# Patient Record
Sex: MALE | Race: WHITE | NOT HISPANIC OR LATINO | Employment: UNEMPLOYED | URBAN - METROPOLITAN AREA
[De-identification: names, ages, dates, MRNs, and addresses within clinical notes are randomized per-mention and may not be internally consistent; named-entity substitution may affect disease eponyms.]

---

## 2018-11-30 ENCOUNTER — HOSPITAL ENCOUNTER (OUTPATIENT)
Dept: LAB | Facility: HOSPITAL | Age: 63
Discharge: HOME | End: 2018-11-30
Attending: PSYCHIATRY & NEUROLOGY | Admitting: PSYCHIATRY & NEUROLOGY

## 2018-11-30 LAB
ALBUMIN SERPL-MCNC: 3.8 G/DL (ref 3.5–4.8)
ALPHA1 GLOB FLD ELPH-MCNC: 0.2 GM/DL (ref 0.1–0.4)
ALPHA2 GLOB SERPL ELPH-MCNC: 0.7 GM/DL (ref 0.5–1)
B-GLOBULIN SERPL ELPH-MCNC: 1 GM/DL (ref 0.7–1.4)
CONV TOTAL PROTEIN: 7.1 G/DL (ref 6.1–7.9)
ERYTHROCYTE [SEDIMENTATION RATE] IN BLOOD BY WESTERGREN METHOD: 17 MM/HR (ref 0–20)
GAMMA GLOB SERPL ELPH-MCNC: 1.3 GM/DL (ref 0.6–1.6)
INSULIN SERPL-ACNC: NORMAL U[IU]/ML

## 2018-12-03 LAB
ANA SER QL IA: NORMAL

## 2018-12-21 ENCOUNTER — HOSPITAL ENCOUNTER (OUTPATIENT)
Dept: ORTHOPEDIC SURGERY | Facility: CLINIC | Age: 63
Discharge: HOME | End: 2018-12-21
Attending: PHYSICIAN ASSISTANT | Admitting: PHYSICIAN ASSISTANT

## 2018-12-27 ENCOUNTER — HOSPITAL ENCOUNTER (OUTPATIENT)
Dept: MRI IMAGING | Facility: HOSPITAL | Age: 63
Discharge: HOME | End: 2018-12-27
Attending: PHYSICIAN ASSISTANT | Admitting: PHYSICIAN ASSISTANT

## 2018-12-27 LAB — CREAT BLDA-MCNC: 0.7 MG/DL (ref 0.6–1.3)

## 2019-02-07 ENCOUNTER — HOSPITAL ENCOUNTER (OUTPATIENT)
Dept: ORTHOPEDIC SURGERY | Facility: CLINIC | Age: 64
Discharge: HOME | End: 2019-02-07
Attending: NEUROLOGICAL SURGERY | Admitting: NEUROLOGICAL SURGERY

## 2019-04-08 ENCOUNTER — HOSPITAL ENCOUNTER (OUTPATIENT)
Dept: ORTHOPEDIC SURGERY | Facility: CLINIC | Age: 64
Discharge: HOME | End: 2019-04-08
Attending: NEUROLOGICAL SURGERY | Admitting: NEUROLOGICAL SURGERY

## 2019-05-23 ENCOUNTER — CONVERSION ENCOUNTER (OUTPATIENT)
Dept: CARDIOLOGY | Facility: CLINIC | Age: 64
End: 2019-05-23

## 2019-06-04 VITALS
SYSTOLIC BLOOD PRESSURE: 143 MMHG | HEART RATE: 61 BPM | DIASTOLIC BLOOD PRESSURE: 84 MMHG | OXYGEN SATURATION: 96 % | WEIGHT: 195 LBS

## 2019-06-06 ENCOUNTER — HOSPITAL ENCOUNTER (OUTPATIENT)
Dept: ORTHOPEDIC SURGERY | Facility: CLINIC | Age: 64
Discharge: HOME | End: 2019-06-06
Attending: NEUROLOGICAL SURGERY | Admitting: NEUROLOGICAL SURGERY

## 2019-06-11 ENCOUNTER — TRANSCRIBE ORDERS (OUTPATIENT)
Dept: PHYSICAL THERAPY | Facility: CLINIC | Age: 64
End: 2019-06-11

## 2019-06-11 DIAGNOSIS — M47.12 MYELOPATHY, SPONDYLOGENIC, CERVICAL: ICD-10-CM

## 2019-06-11 DIAGNOSIS — M50.20 HERNIATED CERVICAL INTERVERTEBRAL DISC: ICD-10-CM

## 2019-06-11 DIAGNOSIS — M43.22 CERVICAL VERTEBRAL FUSION: Primary | ICD-10-CM

## 2019-06-17 ENCOUNTER — OFFICE VISIT (OUTPATIENT)
Dept: PHYSICAL THERAPY | Facility: CLINIC | Age: 64
End: 2019-06-17

## 2019-06-17 DIAGNOSIS — M54.2 PAIN OF CERVICAL SPINE: Primary | ICD-10-CM

## 2019-06-17 DIAGNOSIS — R42 DIZZINESS AND GIDDINESS: ICD-10-CM

## 2019-06-17 DIAGNOSIS — M54.2 NECK PAIN, CHRONIC: ICD-10-CM

## 2019-06-17 DIAGNOSIS — M43.22 FUSION OF SPINE OF CERVICAL REGION: Primary | ICD-10-CM

## 2019-06-17 DIAGNOSIS — M47.12 OTHER SPONDYLOSIS WITH MYELOPATHY, CERVICAL REGION: ICD-10-CM

## 2019-06-17 DIAGNOSIS — M50.20 OTHER CERVICAL DISC DISPLACEMENT, UNSPECIFIED CERVICAL REGION: ICD-10-CM

## 2019-06-17 DIAGNOSIS — G89.29 NECK PAIN, CHRONIC: ICD-10-CM

## 2019-06-17 PROCEDURE — 97140 MANUAL THERAPY 1/> REGIONS: CPT | Performed by: PHYSICAL THERAPIST

## 2019-06-17 PROCEDURE — 97110 THERAPEUTIC EXERCISES: CPT | Performed by: PHYSICAL THERAPIST

## 2019-06-17 NOTE — PROGRESS NOTES
Physical Therapy Daily Progress Note         Visit # : 7  Hiren Mart    Subjective Reports 1-2/10 pain currently. Pt states pain varies day to day. Dizziness is about the same. Pt will be on vacation over the next couple of weeks, then will be having further testing when he returns to town.        Objective   See Exercise, Manual, and Modality Logs for complete treatment.       Assessment/Plan Pt tolerated manual therapy fairly well, but reported some increased stiffness with turning head left when switching UEs for nerve glides. Therex was well tolerated, but pt felt some increased stiffness at end of session. No c/o any significant increase in pain by end of session.     Progress per Plan of Care when pt returns from vacation and further testing.           Manual Therapy:    26     mins  39399;  Therapeutic Exercise:    15     mins  51155;     Neuromuscular [place]:        mins  94843;    Therapeutic Activity:          mins  84833;     Gait Training:           mins  63883;     Ultrasound:          mins  72696;    Electrical Stimulation:         mins  66289 ( );  Dry Needling          mins self-pay    Timed Treatment:   41   mins   Total Treatment:     41   mins    Mana Franco, PT MS  Physical Therapist

## 2019-07-01 ENCOUNTER — APPOINTMENT (OUTPATIENT)
Dept: CT IMAGING | Facility: HOSPITAL | Age: 64
End: 2019-07-01

## 2019-08-09 RX ORDER — LORATADINE 10 MG/1
TABLET ORAL EVERY 24 HOURS
COMMUNITY
Start: 2018-11-15

## 2019-08-09 RX ORDER — APIXABAN 5 MG/1
TABLET, FILM COATED ORAL
Refills: 0 | COMMUNITY
Start: 2019-06-03

## 2019-08-09 RX ORDER — HYDROCODONE BITARTRATE AND ACETAMINOPHEN 10; 325 MG/1; MG/1
TABLET ORAL
COMMUNITY
Start: 2018-11-15

## 2019-08-09 RX ORDER — OSELTAMIVIR PHOSPHATE 75 MG/1
CAPSULE ORAL
COMMUNITY
End: 2019-12-16

## 2019-08-09 RX ORDER — PREDNISONE 20 MG/1
TABLET ORAL
COMMUNITY
End: 2019-12-16

## 2019-08-09 RX ORDER — ATORVASTATIN CALCIUM 10 MG/1
10 TABLET, FILM COATED ORAL DAILY
Refills: 3 | COMMUNITY
Start: 2019-05-17

## 2019-08-09 RX ORDER — BENZONATATE 200 MG/1
CAPSULE ORAL
COMMUNITY
End: 2019-12-16

## 2019-08-09 RX ORDER — AZITHROMYCIN 250 MG/1
TABLET, FILM COATED ORAL
COMMUNITY
End: 2019-12-16

## 2019-08-09 RX ORDER — FLUTICASONE PROPIONATE 50 MCG
SPRAY, SUSPENSION (ML) NASAL
COMMUNITY
Start: 2018-10-02

## 2019-08-09 RX ORDER — MECLIZINE HYDROCHLORIDE 25 MG/1
TABLET ORAL
COMMUNITY
End: 2019-12-16

## 2019-08-09 RX ORDER — MELOXICAM 15 MG/1
TABLET ORAL
COMMUNITY
Start: 2019-05-09

## 2019-08-09 RX ORDER — ZOLPIDEM TARTRATE 10 MG/1
TABLET ORAL
Refills: 0 | COMMUNITY
Start: 2019-06-14

## 2019-08-22 ENCOUNTER — OFFICE VISIT (OUTPATIENT)
Dept: CARDIOLOGY | Facility: CLINIC | Age: 64
End: 2019-08-22

## 2019-08-22 VITALS
BODY MASS INDEX: 27.62 KG/M2 | SYSTOLIC BLOOD PRESSURE: 134 MMHG | WEIGHT: 198 LBS | DIASTOLIC BLOOD PRESSURE: 83 MMHG | OXYGEN SATURATION: 98 % | HEART RATE: 60 BPM

## 2019-08-22 DIAGNOSIS — I48.0 AF (PAROXYSMAL ATRIAL FIBRILLATION) (HCC): Primary | ICD-10-CM

## 2019-08-22 PROCEDURE — 99214 OFFICE O/P EST MOD 30 MIN: CPT | Performed by: INTERNAL MEDICINE

## 2019-08-22 PROCEDURE — 93000 ELECTROCARDIOGRAM COMPLETE: CPT | Performed by: INTERNAL MEDICINE

## 2019-08-22 NOTE — PROGRESS NOTES
Encounter Date:08/22/2019  Last seen 5/23/2019      Patient ID: Hiren Mart is a 63 y.o. male.    Chief Complaint:  Follow-up paroxysmal atrial fibrillation  Dyslipidemia  Anticoagulation management.    History of Present Illness  Since I have last seen, the patient has been without any chest discomfort ,shortness of breath, palpitations, dizziness or syncope.  Denies having any headache ,abdominal pain ,nausea, vomiting , diarrhea constipation, loss of weight or loss of appetite.  Denies having any excessive bruising ,hematuria or blood in the stool.    Review of all systems negative except as indicated      Assessment and Plan       ]]]]]]]]]]]]]]   impression  =========  - history of intermittent dizziness and paroxysmal atrial fibrillation.  Improved.  There is some correlation between dizziness and heme being in atrial fibrillation.    Echocardiogram revealed normal left ventricular function mild right ventricle enlargement 01/28/2019  Thyroid function was normal    -Status post   cervical spine surgery on 01/22/2019 and underwent a C5 corpectomy at C6-7 diskectomy was C4 through 7 anterior cervical arthrodesis secondary to spondylitic myeloradiculopathy Form by Dr. Katz.     -Dysphagia after surgery. -improved     -Paroxysmal atrial fibrillation which may have been responsible for dizzy spells as an outpatient.     -Dyslipidemia  ===========  Plan  ==========  Patient is not having any angina pectoris or congestive heart failure.  Palpitations have improved.  EKG showed sinus bradycardia without ischemic changes    Continue metoprolol XL 25 mg 3 times a day  and Eliquis 5 mg twice a day  Anticoagulation status was reviewed  Follow-up in the office in 6 months.  Further plan will depend on patient's progress.   ]]]]]]]]]]]]]]]]         Diagnosis Plan   1. AF (paroxysmal atrial fibrillation) (CMS/[facility])  ECG 12 Lead   LAB RESULTS (LAST 7 DAYS)    CBC        BMP        CMP         BNP        TROPONIN         CoAg        Creatinine Clearance  Estimated Creatinine Clearance: 106.7 mL/min (by C-G formula based on SCr of 0.9 mg/dL).    ABG        Radiology  No radiology results for the last day                The following portions of the patient's history were reviewed and updated as appropriate: allergies, current medications, past family history, past medical history, past social history, past surgical history and problem list.    Review of Systems   Constitution: Negative for malaise/fatigue.   Cardiovascular: Negative for chest pain, leg swelling, palpitations and syncope.   Respiratory: Negative for shortness of breath.    Skin: Negative for rash.   Gastrointestinal: Positive for nausea. Negative for vomiting.   Neurological: Positive for dizziness and numbness (hands). Negative for light-headedness.         Current Outpatient Medications:   •  fluticasone (FLONASE) 50 MCG/ACT nasal spray, fluticasone propionate 50 mcg/actuation nasal spray,suspension, Disp: , Rfl:   •  HYDROcodone-acetaminophen (NORCO)  MG per tablet, hydrocodone 10 mg-acetaminophen 325 mg tablet, Disp: , Rfl:   •  loratadine (CLARITIN) 10 MG tablet, Daily., Disp: , Rfl:   •  meloxicam (MOBIC) 15 MG tablet, meloxicam 15 mg tablet, Disp: , Rfl:   •  atorvastatin (LIPITOR) 10 MG tablet, Take 10 mg by mouth Daily. 200001, Disp: , Rfl: 3  •  azithromycin (ZITHROMAX) 250 MG tablet, azithromycin 250 mg tablet, Disp: , Rfl:   •  benzonatate (TESSALON) 200 MG capsule, benzonatate 200 mg capsule, Disp: , Rfl:   •  ELIQUIS 5 MG tablet tablet, , Disp: , Rfl: 0  •  meclizine (ANTIVERT) 25 MG tablet, meclizine 25 mg tablet, Disp: , Rfl:   •  oseltamivir (TAMIFLU) 75 MG capsule, oseltamivir 75 mg capsule, Disp: , Rfl:   •  predniSONE (DELTASONE) 20 MG tablet, prednisone 20 mg tablet, Disp: , Rfl:   •  zolpidem (AMBIEN) 10 MG tablet, , Disp: , Rfl: 0    Allergies   Allergen Reactions   • Penicillins Other (See Comments)     AS CHILD UNKNOWN REACTION          Family History   Problem Relation Age of Onset   • Heart disease Mother    • Stroke Father    • Diabetes Brother        Past Surgical History:   Procedure Laterality Date   • APPENDECTOMY     • BACK SURGERY     • HERNIA REPAIR         Past Medical History:   Diagnosis Date   • Hyperlipidemia        Family History   Problem Relation Age of Onset   • Heart disease Mother    • Stroke Father    • Diabetes Brother        Social History     Socioeconomic History   • Marital status:      Spouse name: Not on file   • Number of children: Not on file   • Years of education: Not on file   • Highest education level: Not on file   Tobacco Use   • Smoking status: Current Every Day Smoker     Types: Electronic Cigarette   • Smokeless tobacco: Never Used   Substance and Sexual Activity   • Alcohol use: Yes     Comment: rare           ECG 12 Lead  Date/Time: 8/22/2019 4:40 PM  Performed by: John Mon MD  Authorized by: John Mon MD   Comparison: compared with previous ECG   Comments: Sinus bradycardia 54/min normal axis normal intervals no ectopy.  No change from 5/23/2019              Objective:       Physical Exam    /83 (BP Location: Left arm, Patient Position: Lying, Cuff Size: Adult)   Pulse 60   Wt 89.8 kg (198 lb)   SpO2 98%   BMI 27.62 kg/m²   The patient is alert, oriented and in no distress.    Vital signs as noted above.    Head and neck revealed no carotid bruits or jugular venous distension.  No thyromegaly or lymphadenopathy is present.    Lungs clear.  No wheezing.  Breath sounds are normal bilaterally.    Heart normal first and second heart sounds.  No murmur..  No pericardial rub is present.  No gallop is present.    Abdomen soft and nontender.  No organomegaly is present.    Extremities revealed good peripheral pulses without any pedal edema.    Skin warm and dry.    Musculoskeletal system is grossly normal.    CNS grossly normal.

## 2019-09-16 ENCOUNTER — OFFICE VISIT (OUTPATIENT)
Dept: NEUROSURGERY | Facility: CLINIC | Age: 64
End: 2019-09-16

## 2019-09-16 VITALS
HEIGHT: 71 IN | BODY MASS INDEX: 28 KG/M2 | OXYGEN SATURATION: 96 % | DIASTOLIC BLOOD PRESSURE: 79 MMHG | RESPIRATION RATE: 18 BRPM | HEART RATE: 62 BPM | WEIGHT: 200 LBS | SYSTOLIC BLOOD PRESSURE: 145 MMHG

## 2019-09-16 DIAGNOSIS — M47.12 CERVICAL SPONDYLOSIS WITH MYELOPATHY: ICD-10-CM

## 2019-09-16 DIAGNOSIS — Z98.1 S/P CERVICAL SPINAL FUSION: ICD-10-CM

## 2019-09-16 DIAGNOSIS — M54.2 NECK PAIN: Primary | ICD-10-CM

## 2019-09-16 PROCEDURE — 99212 OFFICE O/P EST SF 10 MIN: CPT | Performed by: NEUROLOGICAL SURGERY

## 2019-09-16 NOTE — PROGRESS NOTES
"Subjective   Hiren Mart is a 63 y.o. male.     Chief Complaint   Patient presents with   • Follow-up     f/u after imaging      Visit Vitals  /79 (BP Location: Left arm, Patient Position: Sitting, Cuff Size: Large Adult)   Pulse 62   Resp 18   Ht 180.3 cm (71\")   Wt 90.7 kg (200 lb)   SpO2 96%   BMI 27.89 kg/m²       History of Present Illness: Mr Mart is a 64 y/o male that is now 5 months post op from a C5 vertebrectomy with a C4-7 ACDF.  Since the last visit his pain has pretty much resolved.  He still has the dizzy episodes but no real neck pain.  His strength and gait have also improved.  He is to see neurology in the next couple of weeks.     The following portions of the patient's history were reviewed and updated as appropriate: allergies, current medications, past family history, past medical history, past social history, past surgical history and problem list.    Review of Systems      Past Surgical History:   Procedure Laterality Date   • APPENDECTOMY     • BACK SURGERY     • HERNIA REPAIR         Past Medical History:   Diagnosis Date   • Atrial fibrillation (CMS/HCC)    • Hyperlipidemia    • Hypertension    • Neck pain        Objective   Physical Exam  Neurologic Exam  Ortho Exam    No cervical tenderness  Motor is 5/5  Gait much improved    DATE OF EXAM:  9/16/2019 12:13 PM     PROCEDURE:  XR SPINE CERVICAL 2 VW-     INDICATIONS:  neck pain; M54.2-Cervicalgia     COMPARISON:  06/06/2019     TECHNIQUE:   Two or three radiologic views of the cervical spine were obtained.     FINDINGS:  Redemonstration of postoperative changes of ACDF at C4-C7. There is mild  disc narrowing again noted at the C3-4 level with anterior marginal  osteophyte is unchanged. The prevertebral soft tissues are normal in  thickness. The hardware configuration is unchanged. No acute  complication. Lung apices are clear.      IMPRESSION:  Intact ACDF hardware at C4-C7, unchanged from 06/06/2019.         Assessment and " Plan: Mr Mart is doing much better.  His cervical films demonstrate stable hardware placement with no evidence of failure.  I can not see that there is a solid arthrodesis at the corpectomy.  At this time we will see him back in 3 months with a CT of the cervical spine to better address the arthrodesis.       Problems Addressed this Visit     None

## 2019-09-17 PROBLEM — M47.12 CERVICAL SPONDYLOSIS WITH MYELOPATHY: Status: ACTIVE | Noted: 2019-09-17

## 2019-09-17 PROBLEM — Z98.1 S/P CERVICAL SPINAL FUSION: Status: ACTIVE | Noted: 2019-09-17

## 2019-09-17 PROBLEM — M54.2 NECK PAIN: Status: ACTIVE | Noted: 2019-09-17

## 2019-09-19 ENCOUNTER — OFFICE VISIT (OUTPATIENT)
Dept: NEUROLOGY | Facility: CLINIC | Age: 64
End: 2019-09-19

## 2019-09-19 VITALS
DIASTOLIC BLOOD PRESSURE: 81 MMHG | HEIGHT: 72 IN | SYSTOLIC BLOOD PRESSURE: 156 MMHG | WEIGHT: 199.6 LBS | HEART RATE: 67 BPM | BODY MASS INDEX: 27.04 KG/M2

## 2019-09-19 DIAGNOSIS — M47.12 CERVICAL SPONDYLOSIS WITH MYELOPATHY: Primary | ICD-10-CM

## 2019-09-19 DIAGNOSIS — M54.2 NECK PAIN: ICD-10-CM

## 2019-09-19 DIAGNOSIS — Z98.1 S/P CERVICAL SPINAL FUSION: ICD-10-CM

## 2019-09-19 PROCEDURE — 99214 OFFICE O/P EST MOD 30 MIN: CPT | Performed by: PSYCHIATRY & NEUROLOGY

## 2019-09-19 RX ORDER — GABAPENTIN 100 MG/1
CAPSULE ORAL
Qty: 180 CAPSULE | Refills: 11 | Status: SHIPPED | OUTPATIENT
Start: 2019-09-19 | End: 2020-09-22 | Stop reason: SDUPTHER

## 2019-09-19 NOTE — PROGRESS NOTES
Subjective:     Patient ID: Hiren aMrt is a 63 y.o. male.    Chief complaint:  Dizziness &  Paresthesia of lower extremity     Dizziness same as documented at the initial consultation November 2018.  The episodes of dizziness occur several times a day.  When the episodes occur, they may  last for all day.  The episodes are brought on or made worse with turning onto right side, turning onto left side, change in position, sitting to standing and tilting head    Patient wakes up with headaches c/o fatigue takes daytime naps has been told he snores was dx with sleep apnea currently doesn't use a pap machine due to the beard and feels like it suffocates him. Patient had sleep study done about 2 years ago at a sleep clinic in [place] and don't think there still there.    . Patient has a hard time sleeping at night wakes up frequently takes sleep medication and if doesn't take the medication has nightmares, averages 5-6 hours of sleep per night. Still c/o dizziness, was told he had a-fib currently seeing Dr. Mon.      Paresthesia of lower extremity,  EMG was essentially normal   Post op from a C5 vertebrectomy with a C4-7 ACDF pain has resolved still with little pain when sleeps on his neck.  Neck surgery did not help the light headed dizziness feeling.       The following portions of the patient's history were reviewed and updated as appropriate: allergies, current medications, past family history, past medical history, past social history, past surgical history and problem list.      Current Outpatient Medications:   •  atorvastatin (LIPITOR) 10 MG tablet, Take 10 mg by mouth Daily. 200001, Disp: , Rfl: 3  •  azithromycin (ZITHROMAX) 250 MG tablet, azithromycin 250 mg tablet, Disp: , Rfl:   •  benzonatate (TESSALON) 200 MG capsule, benzonatate 200 mg capsule, Disp: , Rfl:   •  ELIQUIS 5 MG tablet tablet, , Disp: , Rfl: 0  •  fluticasone (FLONASE) 50 MCG/ACT nasal spray, fluticasone propionate 50  mcg/actuation nasal spray,suspension, Disp: , Rfl:   •  HYDROcodone-acetaminophen (NORCO)  MG per tablet, hydrocodone 10 mg-acetaminophen 325 mg tablet, Disp: , Rfl:   •  loratadine (CLARITIN) 10 MG tablet, Daily., Disp: , Rfl:   •  meclizine (ANTIVERT) 25 MG tablet, meclizine 25 mg tablet, Disp: , Rfl:   •  meloxicam (MOBIC) 15 MG tablet, meloxicam 15 mg tablet, Disp: , Rfl:   •  metoprolol tartrate (LOPRESSOR) 25 MG tablet, Take 25 mg by mouth 2 (Two) Times a Day., Disp: , Rfl:   •  oseltamivir (TAMIFLU) 75 MG capsule, oseltamivir 75 mg capsule, Disp: , Rfl:   •  predniSONE (DELTASONE) 20 MG tablet, prednisone 20 mg tablet, Disp: , Rfl:   •  zolpidem (AMBIEN) 10 MG tablet, , Disp: , Rfl: 0    Review of Systems   Constitutional: Positive for fatigue. Negative for appetite change.   HENT: Negative for sinus pressure and sinus pain.    Eyes: Negative for pain and itching.   Respiratory: Negative for cough and shortness of breath.    Cardiovascular: Positive for palpitations. Negative for chest pain.   Gastrointestinal: Negative for constipation and diarrhea.   Endocrine: Negative for cold intolerance and heat intolerance.   Genitourinary: Negative for difficulty urinating and frequency.   Musculoskeletal: Positive for back pain and neck pain.   Allergic/Immunologic: Negative for environmental allergies.   Neurological: Positive for dizziness, light-headedness, numbness and headaches. Negative for tremors, seizures, syncope, facial asymmetry, speech difficulty and weakness.   Psychiatric/Behavioral: Negative for agitation and confusion.          I have reviewed ROS completed by medical assistant.     Objective:    Physical Exam   Constitutional: He is oriented to person, place, and time. He appears well-developed and well-nourished.   HENT:   Head: Normocephalic.   Eyes: Conjunctivae and EOM are normal. Pupils are equal, round, and reactive to light.   Neurological: He is alert and oriented to person, place, and  time. No cranial nerve deficit. Coordination normal.   Psychiatric: He has a normal mood and affect.   Nursing note and vitals reviewed.      Assessment/Plan:    Hiren was seen today for dizziness and paresthesia of lower extremity,.    Diagnoses and all orders for this visit:    Cervical spondylosis with myelopathy    S/P cervical spinal fusion    Neck pain      1) non specific  Dizziness.  He has been evaluated extensively with out specific etiology.    No additional testing is felt indicated at this time.    2) Numbness in the hands, probable residual. From the cervical spine problems.     3) burning in legs with numbness- stable - will Rx gabapentin        EPWORTH SLEEPINESS SCALE  Sitting and reading  0  WatchingTV  3  Sitting, inactive, in a public place  0  As a passenger in a car for 1 hour w/o a break  0  Lying down to rest in the afternoon  3  Sitting and talking to someone  0  Sitting quietly after a lunch  0  In a car, while stopped for traffic or a light  0  Total 6          This document has been electronically signed by Joseph Seipel, MD on September 19, 2019 5:41 PM

## 2019-12-13 ENCOUNTER — HOSPITAL ENCOUNTER (OUTPATIENT)
Dept: CT IMAGING | Facility: HOSPITAL | Age: 64
Discharge: HOME | End: 2019-12-13
Admitting: NEUROLOGICAL SURGERY

## 2019-12-13 DIAGNOSIS — M47.12 CERVICAL SPONDYLOSIS WITH MYELOPATHY: ICD-10-CM

## 2019-12-13 DIAGNOSIS — Z98.1 S/P CERVICAL SPINAL FUSION: ICD-10-CM

## 2019-12-13 PROCEDURE — 72125 CT NECK SPINE W/O DYE: CPT

## 2019-12-16 ENCOUNTER — OFFICE VISIT (OUTPATIENT)
Dept: NEUROSURGERY | Facility: CLINIC | Age: 64
End: 2019-12-16

## 2019-12-16 ENCOUNTER — DOCUMENTATION (OUTPATIENT)
Dept: PHYSICAL THERAPY | Facility: CLINIC | Age: 64
End: 2019-12-16

## 2019-12-16 VITALS
SYSTOLIC BLOOD PRESSURE: 137 MMHG | HEART RATE: 49 BPM | DIASTOLIC BLOOD PRESSURE: 79 MMHG | WEIGHT: 205 LBS | HEIGHT: 73 IN | BODY MASS INDEX: 27.17 KG/M2

## 2019-12-16 DIAGNOSIS — Z98.1 S/P CERVICAL SPINAL FUSION: Primary | ICD-10-CM

## 2019-12-16 PROBLEM — E78.5 HYPERLIPIDEMIA: Status: ACTIVE | Noted: 2018-11-15

## 2019-12-16 PROBLEM — G89.29 CHRONIC MIDLINE LOW BACK PAIN WITH BILATERAL SCIATICA: Status: ACTIVE | Noted: 2017-05-15

## 2019-12-16 PROBLEM — M19.90 OSTEOARTHRITIS: Status: ACTIVE | Noted: 2018-11-15

## 2019-12-16 PROBLEM — M54.50 LOW BACK PAIN: Status: ACTIVE | Noted: 2018-12-21

## 2019-12-16 PROBLEM — R42 DIZZINESS: Status: ACTIVE | Noted: 2018-05-02

## 2019-12-16 PROBLEM — M47.812 CERVICAL SPONDYLOSIS: Status: ACTIVE | Noted: 2019-04-29

## 2019-12-16 PROBLEM — I48.0 PAROXYSMAL ATRIAL FIBRILLATION (HCC): Status: ACTIVE | Noted: 2019-02-06

## 2019-12-16 PROBLEM — M54.41 CHRONIC MIDLINE LOW BACK PAIN WITH BILATERAL SCIATICA: Status: ACTIVE | Noted: 2017-05-15

## 2019-12-16 PROBLEM — M54.2 NECK PAIN: Status: ACTIVE | Noted: 2018-11-15

## 2019-12-16 PROBLEM — G47.00 INSOMNIA: Status: ACTIVE | Noted: 2017-05-15

## 2019-12-16 PROBLEM — G95.0 SYRINX OF SPINAL CORD (HCC): Status: ACTIVE | Noted: 2018-12-21

## 2019-12-16 PROBLEM — M43.22 CERVICAL SPINE ANKYLOSIS: Status: ACTIVE | Noted: 2019-02-07

## 2019-12-16 PROBLEM — G95.89 MYELOMALACIA (HCC): Status: ACTIVE | Noted: 2018-12-21

## 2019-12-16 PROBLEM — M50.20 CERVICAL DISC HERNIATION: Status: ACTIVE | Noted: 2018-12-21

## 2019-12-16 PROBLEM — M47.12 CERVICAL SPONDYLOSIS WITH MYELOPATHY: Status: ACTIVE | Noted: 2018-12-04

## 2019-12-16 PROBLEM — IMO0002 EXCESSIVE ANTICOAGULATION: Status: ACTIVE | Noted: 2019-02-14

## 2019-12-16 PROBLEM — R20.2 PARESTHESIA OF LOWER EXTREMITY: Status: ACTIVE | Noted: 2018-11-15

## 2019-12-16 PROBLEM — R20.2 PARESTHESIA OF UPPER LIMB: Status: ACTIVE | Noted: 2019-05-09

## 2019-12-16 PROBLEM — M54.42 CHRONIC MIDLINE LOW BACK PAIN WITH BILATERAL SCIATICA: Status: ACTIVE | Noted: 2017-05-15

## 2019-12-16 PROBLEM — Z98.890 HX OF CERVICAL SPINE SURGERY: Status: ACTIVE | Noted: 2019-05-13

## 2019-12-16 PROBLEM — M17.0 DEGENERATIVE ARTHRITIS OF KNEE, BILATERAL: Status: ACTIVE | Noted: 2017-05-11

## 2019-12-16 PROBLEM — M47.029 VERTEBRAL ARTERY COMPRESSION SYNDROME: Status: ACTIVE | Noted: 2018-12-21

## 2019-12-16 PROCEDURE — 99213 OFFICE O/P EST LOW 20 MIN: CPT | Performed by: PHYSICIAN ASSISTANT

## 2019-12-16 NOTE — PROGRESS NOTES
"Subjective   Hiren Mart is a 64 y.o. male.     Chief Complaint   Patient presents with   • Neck Pain     Follow up CT /      Visit Vitals  /79   Pulse (!) 49   Ht 185.4 cm (73\")   Wt 93 kg (205 lb)   BMI 27.05 kg/m²       History of Present Illness:  Mr. Hiren Mart is a 64-year-old male presents the office today as a postop follow-up.  On 1/22/2019 patient underwent a 5 vertebrectomy with a C4-7 anterior cervical discectomy and fusion.  Patient has no new concerns or complaints today.    The following portions of the patient's history were reviewed and updated as appropriate: allergies, current medications, past family history, past medical history, past social history, past surgical history and problem list.    Review of Systems   Constitutional: Negative for activity change, appetite change, chills, diaphoresis, fatigue and fever.   Gastrointestinal: Negative for nausea and vomiting.   Genitourinary: Negative for urinary incontinence.   Musculoskeletal: Negative for back pain and neck pain.   Neurological: Negative for weakness and numbness.   Psychiatric/Behavioral: Negative for sleep disturbance.         Past Surgical History:   Procedure Laterality Date   • APPENDECTOMY     • BACK SURGERY     • HERNIA REPAIR     • NECK SURGERY         Past Medical History:   Diagnosis Date   • Atrial fibrillation (CMS/HCC)    • Cancer (CMS/HCC)    • Hyperlipidemia    • Hypertension    • Neck pain        Objective   Physical Exam   Constitutional: He is oriented to person, place, and time. He appears well-developed and well-nourished.   HENT:   Head: Normocephalic.   Eyes: Pupils are equal, round, and reactive to light.   Neurological: He is alert and oriented to person, place, and time.   No motor deficits or sensory deficits   Skin:   Well-healed surgical incision   Vitals reviewed.    Neurologic Exam     Mental Status   Oriented to person, place, and time.     Cranial Nerves     CN III, IV, VI   Pupils are " equal, round, and reactive to light.    Ortho Exam        Assessment and Plan:   Mr. Mart is now approximately 11 months postop from a C5 vertebrectomy of C4-7 ACDF.  Dr. Katz did review his CT scan of his cervical spine and although he does demonstrate some bone ingrowth he does not have a solid fusion.  Patient denies any pain or return of his preoperative symptoms.  He has no difficulty swallowing.  He has no concerns today.      At this time, since he is asymptomatic, we will plan to follow Mr. Mart with a repeat CT scan in 2 months to reassess the area of fusion, he will be a little over 12 months postop at that point.      Problems Addressed this Visit     None

## 2019-12-16 NOTE — PROGRESS NOTES
Discharge Summary  Discharge Summary from Physical Therapy Report      Dates  PT visit: Last seen 6/17/19  Number of Visits: 7    Discharge Status of Patient: Pt was seen for 7 PT visits with 6 of them in the [facility] system. Pt went on vacation and was going to have some further testing when he returned. Pt never returned any calls to determine if he would be returning so pt was discharged.     Goals: undetermined as pt never returned to PT after the 7th visit    Discharge Plan: no specific D/C instructions were given as pt never returned to PT after going on vacation.    Date of Discharge: 12/16/19      Mana Franco, PT  Physical Therapist  [place] License # [id number]

## 2020-02-19 ENCOUNTER — TELEPHONE (OUTPATIENT)
Dept: NEUROSURGERY | Facility: CLINIC | Age: 65
End: 2020-02-19

## 2020-02-19 DIAGNOSIS — Z98.1 S/P CERVICAL SPINAL FUSION: Primary | ICD-10-CM

## 2020-02-19 DIAGNOSIS — M47.12 CERVICAL SPONDYLOSIS WITH MYELOPATHY: ICD-10-CM

## 2020-09-22 ENCOUNTER — OFFICE VISIT (OUTPATIENT)
Dept: NEUROLOGY | Facility: CLINIC | Age: 65
End: 2020-09-22

## 2020-09-22 VITALS
TEMPERATURE: 98 F | SYSTOLIC BLOOD PRESSURE: 134 MMHG | DIASTOLIC BLOOD PRESSURE: 81 MMHG | HEIGHT: 73 IN | WEIGHT: 195.4 LBS | HEART RATE: 56 BPM | BODY MASS INDEX: 25.9 KG/M2

## 2020-09-22 DIAGNOSIS — M47.12 CERVICAL SPONDYLOSIS WITH MYELOPATHY: Primary | ICD-10-CM

## 2020-09-22 PROCEDURE — 99212 OFFICE O/P EST SF 10 MIN: CPT | Performed by: PSYCHIATRY & NEUROLOGY

## 2020-09-22 RX ORDER — GABAPENTIN 100 MG/1
CAPSULE ORAL
Qty: 180 CAPSULE | Refills: 5 | Status: SHIPPED | OUTPATIENT
Start: 2020-09-22

## 2020-09-22 NOTE — PROGRESS NOTES
Subjective: Cervical spondylosis with myelopathy, S/P cervical spinal fusion, Neck pain    Patient ID: Hiren Mart is a 64 y.o. male.    History of Present Illness, yearly f/u     Cervical spondylosis with myelopathy, burning in legs with numbness controlled with gabapentin. Unchanged      S/P cervical spinal fusion, numbness in the hands. Unchanged      Neck pain, dizziness with moving directions.    Patient still with dizziness, neck pain is some better and sometimes has numbness and pain down the shoulders bilateral.   Pt has vertigo  Brought on by change in position or turning head   Also has light headedness at times    The following portions of the patient's history were reviewed and updated as appropriate: allergies, current medications, past family history, past medical history, past social history, past surgical history and problem list.    Family History   Problem Relation Age of Onset   • Heart disease Mother    • Stroke Father    • Diabetes Brother        Past Medical History:   Diagnosis Date   • Atrial fibrillation (CMS/HCC)    • Cancer (CMS/HCC)    • Hyperlipidemia    • Hypertension    • Neck pain        Social History     Socioeconomic History   • Marital status:      Spouse name: Not on file   • Number of children: Not on file   • Years of education: Not on file   • Highest education level: Not on file   Tobacco Use   • Smoking status: Current Every Day Smoker     Types: Electronic Cigarette   • Smokeless tobacco: Never Used   Substance and Sexual Activity   • Alcohol use: Yes     Comment: rare   • Drug use: No   • Sexual activity: Defer         Current Outpatient Medications:   •  atorvastatin (LIPITOR) 10 MG tablet, Take 10 mg by mouth Daily. 200001, Disp: , Rfl: 3  •  ELIQUIS 5 MG tablet tablet, , Disp: , Rfl: 0  •  fluticasone (FLONASE) 50 MCG/ACT nasal spray, fluticasone propionate 50 mcg/actuation nasal spray,suspension, Disp: , Rfl:   •  gabapentin (NEURONTIN) 100 MG capsule, One  or two tid, Disp: 180 capsule, Rfl: 11  •  HYDROcodone-acetaminophen (NORCO)  MG per tablet, hydrocodone 10 mg-acetaminophen 325 mg tablet, Disp: , Rfl:   •  loratadine (CLARITIN) 10 MG tablet, Daily., Disp: , Rfl:   •  meloxicam (MOBIC) 15 MG tablet, meloxicam 15 mg tablet, Disp: , Rfl:   •  metoprolol tartrate (LOPRESSOR) 25 MG tablet, Take 1 tablet by mouth 3 (Three) Times a Day., Disp: 270 tablet, Rfl: 1  •  zolpidem (AMBIEN) 10 MG tablet, , Disp: , Rfl: 0    Review of Systems   Constitutional: Positive for fatigue. Negative for appetite change.   HENT: Negative for sinus pressure and sinus pain.    Eyes: Negative for pain and itching.   Respiratory: Negative for cough and shortness of breath.    Cardiovascular: Negative for chest pain and palpitations.   Gastrointestinal: Negative for constipation and diarrhea.   Endocrine: Negative for cold intolerance and heat intolerance.   Genitourinary: Negative for difficulty urinating and frequency.   Musculoskeletal: Positive for back pain and neck pain.   Allergic/Immunologic: Negative for environmental allergies.   Neurological: Positive for dizziness, light-headedness and numbness. Negative for tremors, seizures, syncope, facial asymmetry, speech difficulty, weakness and headaches.   Psychiatric/Behavioral: Negative for agitation and confusion.          I have reviewed ROS completed by medical assistant.     Objective:    Neurologic Exam     Mental Status   Oriented to person, place, and time.   Attention: normal.   Level of consciousness: alert      Physical Exam  Vitals signs reviewed.   Neurological:      General: No focal deficit present.      Mental Status: He is oriented to person, place, and time.   Psychiatric:         Mood and Affect: Mood normal.         Behavior: Behavior normal.         Assessment/Plan:    Hiren was seen today for cervical spondylosis with myelopathy.    Diagnoses and all orders for this visit:    Cervical spondylosis with  myelopathy        reviewed ct of neck, pt was to have repeat ct last march but covid stopped that  Recommend he fu with Dr rodriguez.  Probably needs to have repeat ct of neck.    This document has been electronically signed by Joseph Seipel, MD on September 22, 2020 16:51 EDT